# Patient Record
Sex: MALE | Race: WHITE | NOT HISPANIC OR LATINO
[De-identification: names, ages, dates, MRNs, and addresses within clinical notes are randomized per-mention and may not be internally consistent; named-entity substitution may affect disease eponyms.]

---

## 2022-12-14 ENCOUNTER — DASHBOARD ENCOUNTERS (OUTPATIENT)
Age: 22
End: 2022-12-14

## 2022-12-14 ENCOUNTER — WEB ENCOUNTER (OUTPATIENT)
Dept: URBAN - METROPOLITAN AREA CLINIC 50 | Facility: CLINIC | Age: 22
End: 2022-12-14

## 2022-12-14 ENCOUNTER — OFFICE VISIT (OUTPATIENT)
Dept: URBAN - METROPOLITAN AREA CLINIC 50 | Facility: CLINIC | Age: 22
End: 2022-12-14
Payer: COMMERCIAL

## 2022-12-14 VITALS
BODY MASS INDEX: 19.61 KG/M2 | WEIGHT: 137 LBS | SYSTOLIC BLOOD PRESSURE: 115 MMHG | HEIGHT: 70 IN | TEMPERATURE: 98.1 F | DIASTOLIC BLOOD PRESSURE: 75 MMHG | HEART RATE: 79 BPM

## 2022-12-14 DIAGNOSIS — R19.4 CHANGE IN BOWEL HABITS: ICD-10-CM

## 2022-12-14 DIAGNOSIS — R13.10 DYSPHAGIA, UNSPECIFIED TYPE: ICD-10-CM

## 2022-12-14 DIAGNOSIS — R53.83 FATIGUE, UNSPECIFIED TYPE: ICD-10-CM

## 2022-12-14 DIAGNOSIS — R11.0 NAUSEA: ICD-10-CM

## 2022-12-14 DIAGNOSIS — R19.7 DIARRHEA, UNSPECIFIED TYPE: ICD-10-CM

## 2022-12-14 DIAGNOSIS — K62.5 RECTAL BLEED: ICD-10-CM

## 2022-12-14 DIAGNOSIS — R15.2 FECAL URGENCY: ICD-10-CM

## 2022-12-14 DIAGNOSIS — R05.3 CHRONIC COUGH: ICD-10-CM

## 2022-12-14 DIAGNOSIS — R63.4 WEIGHT LOSS: ICD-10-CM

## 2022-12-14 DIAGNOSIS — K21.9 GASTROESOPHAGEAL REFLUX DISEASE, UNSPECIFIED WHETHER ESOPHAGITIS PRESENT: ICD-10-CM

## 2022-12-14 DIAGNOSIS — K92.1 BLOOD IN STOOL: ICD-10-CM

## 2022-12-14 DIAGNOSIS — R10.9 ABDOMINAL PAIN, UNSPECIFIED ABDOMINAL LOCATION: ICD-10-CM

## 2022-12-14 PROBLEM — 235595009: Status: ACTIVE | Noted: 2022-12-14

## 2022-12-14 PROBLEM — 40739000: Status: ACTIVE | Noted: 2022-12-14

## 2022-12-14 PROCEDURE — 99214 OFFICE O/P EST MOD 30 MIN: CPT

## 2022-12-14 RX ORDER — ONDANSETRON 4 MG/1
1 TABLET ON THE TONGUE AND ALLOW TO DISSOLVE TABLET, ORALLY DISINTEGRATING ORAL ONCE A DAY
Qty: 30 | Refills: 0 | OUTPATIENT
Start: 2022-12-14

## 2022-12-14 RX ORDER — DICYCLOMINE HYDROCHLORIDE 10 MG/1
2 CAPSULES CAPSULE ORAL THREE TIMES A DAY
Qty: 180 | Refills: 0 | OUTPATIENT
Start: 2022-12-14 | End: 2023-01-13

## 2022-12-14 RX ORDER — PANTOPRAZOLE SODIUM 20 MG/1
AS DIRECTED TABLET, DELAYED RELEASE ORAL
Qty: 70 | Refills: 0 | OUTPATIENT
Start: 2022-12-14

## 2022-12-14 NOTE — HPI-TODAY'S VISIT:
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  Notice other thing that are are smaller issues  + loose stool (2-3x/day) + diarrhea (few times per month, maybe 1x/week)  + BRBPR (occurs roughtly 1x/month) used to be a couple of times per year  + pain with passing BM even with softer/looser stool  + generalized abdominal pain - dull, achey, tender (occurs when has diarrhea)  Had entire genome sequenced Has had flu like symptoms (resolves within a few days) - inflammation whole body, exhausted, cough These sx occur about 4-5x/year  To the point that he want sot sleept all day - lasts for 2-3 days and then feels better  + migraines  + chronic fatigue  + nausea, no vomiting  Labs 10/5/22: CMP normal, vitamin B12 normal, TSH normal, iron/tibc normal Labs 7/6/22: hep B and C nonreactive + weight loss (10lbs over past 6 months) - also recently started adderall

## 2022-12-17 LAB
ABSOLUTE BASOPHILS: 41
ABSOLUTE EOSINOPHILS: 184
ABSOLUTE LYMPHOCYTES: 2672
ABSOLUTE MONOCYTES: 496
ABSOLUTE NEUTROPHILS: 3407
BASOPHILS: 0.6
C-REACTIVE PROTEIN, QUANT: 0.4
EOSINOPHILS: 2.7
FERRITIN, SERUM: 122
FOLATE (FOLIC ACID), SERUM: 8.6
HEMATOCRIT: 44.7
HEMOGLOBIN: 14.9
IMMUNOGLOBULIN A, QN, SERUM: 196
INTERPRETATION: (no result)
IRON BIND.CAP.(TIBC): 343
IRON SATURATION: 38
IRON: 131
LYMPHOCYTES: 39.3
MCH: 29.2
MCHC: 33.3
MCV: 87.6
MONOCYTES: 7.3
MPV: 10.3
NEUTROPHILS: 50.1
PLATELET COUNT: 341
RDW: 11.8
RED BLOOD CELL COUNT: 5.1
T-TRANSGLUTAMINASE (TTG) IGA: <1
WHITE BLOOD CELL COUNT: 6.8

## 2022-12-19 ENCOUNTER — TELEPHONE ENCOUNTER (OUTPATIENT)
Dept: URBAN - METROPOLITAN AREA CLINIC 90 | Facility: CLINIC | Age: 22
End: 2022-12-19

## 2022-12-19 RX ORDER — METRONIDAZOLE 500 MG/1
1 TABLET TABLET ORAL TWICE A DAY
Qty: 14 TABLET | Refills: 0 | OUTPATIENT
Start: 2022-12-19 | End: 2022-12-26

## 2022-12-21 LAB
CALPROTECTIN, STOOL - QDX: (no result)
FECAL FAT, QUALITATIVE: (no result)
GASTROINTESTINAL PATHOGEN: (no result)
LACTOFERRIN SCAN (QUANTITATIVE) - QDX: NEGATIVE
PANCREATICELASTASE ELISA, STOOL: (no result)
STOOL, WHITE BLOOD CELLS: (no result)

## 2022-12-27 ENCOUNTER — TELEPHONE ENCOUNTER (OUTPATIENT)
Dept: URBAN - METROPOLITAN AREA CLINIC 50 | Facility: CLINIC | Age: 22
End: 2022-12-27

## 2023-01-25 ENCOUNTER — OFFICE VISIT (OUTPATIENT)
Dept: URBAN - METROPOLITAN AREA TELEHEALTH 2 | Facility: TELEHEALTH | Age: 23
End: 2023-01-25